# Patient Record
Sex: MALE | Employment: FULL TIME | URBAN - METROPOLITAN AREA
[De-identification: names, ages, dates, MRNs, and addresses within clinical notes are randomized per-mention and may not be internally consistent; named-entity substitution may affect disease eponyms.]

---

## 2023-06-05 LAB
ALANINE AMINOTRANSFERASE (SGPT) (U/L) IN SER/PLAS: 34 U/L (ref 10–52)
ALBUMIN (G/DL) IN SER/PLAS: 4.9 G/DL (ref 3.4–5)
ALKALINE PHOSPHATASE (U/L) IN SER/PLAS: 92 U/L (ref 33–120)
ANION GAP IN SER/PLAS: 11 MMOL/L (ref 10–20)
ASPARTATE AMINOTRANSFERASE (SGOT) (U/L) IN SER/PLAS: 39 U/L (ref 9–39)
BILIRUBIN TOTAL (MG/DL) IN SER/PLAS: 0.5 MG/DL (ref 0–1.2)
CALCIDIOL (25 OH VITAMIN D3) (NG/ML) IN SER/PLAS: 32 NG/ML
CALCIUM (MG/DL) IN SER/PLAS: 10.5 MG/DL (ref 8.6–10.6)
CARBON DIOXIDE, TOTAL (MMOL/L) IN SER/PLAS: 32 MMOL/L (ref 21–32)
CHLORIDE (MMOL/L) IN SER/PLAS: 101 MMOL/L (ref 98–107)
CHOLESTEROL (MG/DL) IN SER/PLAS: 184 MG/DL (ref 0–199)
CHOLESTEROL IN HDL (MG/DL) IN SER/PLAS: 35.3 MG/DL
CHOLESTEROL/HDL RATIO: 5.2
CREATININE (MG/DL) IN SER/PLAS: 1.02 MG/DL (ref 0.5–1.3)
ERYTHROCYTE DISTRIBUTION WIDTH (RATIO) BY AUTOMATED COUNT: 11.9 % (ref 11.5–14.5)
ERYTHROCYTE MEAN CORPUSCULAR HEMOGLOBIN CONCENTRATION (G/DL) BY AUTOMATED: 32 G/DL (ref 32–36)
ERYTHROCYTE MEAN CORPUSCULAR VOLUME (FL) BY AUTOMATED COUNT: 92 FL (ref 80–100)
ERYTHROCYTES (10*6/UL) IN BLOOD BY AUTOMATED COUNT: 4.98 X10E12/L (ref 4.5–5.9)
FERRITIN (UG/LL) IN SER/PLAS: 289 UG/L (ref 20–300)
GFR MALE: >90 ML/MIN/1.73M2
GLUCOSE (MG/DL) IN SER/PLAS: 90 MG/DL (ref 74–99)
HEMATOCRIT (%) IN BLOOD BY AUTOMATED COUNT: 45.6 % (ref 41–52)
HEMOGLOBIN (G/DL) IN BLOOD: 14.6 G/DL (ref 13.5–17.5)
LDL: 122 MG/DL (ref 0–119)
LEUKOCYTES (10*3/UL) IN BLOOD BY AUTOMATED COUNT: 5.1 X10E9/L (ref 4.4–11.3)
NRBC (PER 100 WBCS) BY AUTOMATED COUNT: 0 /100 WBC (ref 0–0)
PLATELETS (10*3/UL) IN BLOOD AUTOMATED COUNT: 271 X10E9/L (ref 150–450)
POTASSIUM (MMOL/L) IN SER/PLAS: 4.2 MMOL/L (ref 3.5–5.3)
PROTEIN TOTAL: 8 G/DL (ref 6.4–8.2)
SODIUM (MMOL/L) IN SER/PLAS: 140 MMOL/L (ref 136–145)
THYROTROPIN (MIU/L) IN SER/PLAS BY DETECTION LIMIT <= 0.05 MIU/L: 1.57 MIU/L (ref 0.44–3.98)
TRIGLYCERIDE (MG/DL) IN SER/PLAS: 134 MG/DL (ref 0–149)
URATE (MG/DL) IN SER/PLAS: 5.9 MG/DL (ref 4–7.5)
UREA NITROGEN (MG/DL) IN SER/PLAS: 12 MG/DL (ref 6–23)
VLDL: 27 MG/DL (ref 0–40)

## 2023-09-30 DIAGNOSIS — F90.2 ATTENTION DEFICIT HYPERACTIVITY DISORDER (ADHD), COMBINED TYPE: ICD-10-CM

## 2023-09-30 RX ORDER — DEXTROAMPHETAMINE SACCHARATE, AMPHETAMINE ASPARTATE MONOHYDRATE, DEXTROAMPHETAMINE SULFATE, AMPHETAMINE SULFATE 9.375; 9.375; 9.375; 9.375 MG/1; MG/1; MG/1; MG/1
37.5 CAPSULE, EXTENDED RELEASE ORAL EVERY MORNING
Qty: 30 CAPSULE | Refills: 0 | Status: SHIPPED | OUTPATIENT
Start: 2023-09-30 | End: 2023-11-07 | Stop reason: SDUPTHER

## 2023-11-07 DIAGNOSIS — F90.2 ATTENTION DEFICIT HYPERACTIVITY DISORDER (ADHD), COMBINED TYPE: ICD-10-CM

## 2023-11-07 RX ORDER — DEXTROAMPHETAMINE SACCHARATE, AMPHETAMINE ASPARTATE MONOHYDRATE, DEXTROAMPHETAMINE SULFATE, AMPHETAMINE SULFATE 9.375; 9.375; 9.375; 9.375 MG/1; MG/1; MG/1; MG/1
37.5 CAPSULE, EXTENDED RELEASE ORAL EVERY MORNING
Qty: 30 CAPSULE | Refills: 0 | Status: SHIPPED | OUTPATIENT
Start: 2023-11-07 | End: 2023-12-16 | Stop reason: SDUPTHER

## 2023-11-26 DIAGNOSIS — M25.572 LEFT ANKLE PAIN, UNSPECIFIED CHRONICITY: ICD-10-CM

## 2023-12-13 ENCOUNTER — PROCEDURE VISIT (OUTPATIENT)
Dept: ORTHOPEDIC SURGERY | Facility: CLINIC | Age: 28
End: 2023-12-13
Payer: COMMERCIAL

## 2023-12-13 DIAGNOSIS — S06.0XAA CONCUSSION WITH UNKNOWN LOSS OF CONSCIOUSNESS STATUS, INITIAL ENCOUNTER: Primary | ICD-10-CM

## 2023-12-13 PROCEDURE — 96116 NUBHVL XM PHYS/QHP 1ST HR: CPT | Performed by: CLINICAL NEUROPSYCHOLOGIST

## 2023-12-13 PROCEDURE — 96133 NRPSYC TST EVAL PHYS/QHP EA: CPT | Performed by: CLINICAL NEUROPSYCHOLOGIST

## 2023-12-13 PROCEDURE — 96137 PSYCL/NRPSYC TST PHY/QHP EA: CPT | Performed by: CLINICAL NEUROPSYCHOLOGIST

## 2023-12-13 PROCEDURE — 96136 PSYCL/NRPSYC TST PHY/QHP 1ST: CPT | Performed by: CLINICAL NEUROPSYCHOLOGIST

## 2023-12-13 PROCEDURE — 96132 NRPSYC TST EVAL PHYS/QHP 1ST: CPT | Performed by: CLINICAL NEUROPSYCHOLOGIST

## 2023-12-13 NOTE — PROGRESS NOTES
NEUROPSYCHOLOGICAL CONSULTATION REPORT    PLAYER NAME:  Babatunde Dee  DATE OF INJURY:  12/10/23  DATE OF CONSULT:  12/13/23    HISTORY: Babatunde Dee is a 28 y.o.  who was referred by club staff for neuropsychological evaluation to assess his recovery following a concussion on 12/10/23. He sustained the injury during a game when he sustained a helmet-helmet contact. He reported immediate symptoms of headache, dizziness, confusion, and removed himself from play. He also reported possibly having a brief loss of consciousness on the sidelines after that, with teammates and coaches having to help him stand. He was evaluated by his sports medicine staff and placed in the concussion protocol. Symptoms were noted to progressively improve such that he had some headache on the night of the injury but he reported no symptoms by the morning of the day after. He has moved through the initial steps of the exercise protocol without symptom exacerbation. At the time of the current evaluation, the athlete reported feeling 100% back to baseline.         PAST MEDICAL HISTORY:  In addition to his history of recent concussion, he reported no prior concussions. The athlete did report a history of ADHD that was diagnosed in college and is prescribed MyDayis; however, he noted that within recent weeks, he has stopped taking this medication because he feels that he wants to learn better behavioral techniques for management of his symptoms as opposed to relying on medication. No other additional contributory neurological/medical history was endorsed.      CURRENT MEDICATIONS: Mydayis (37.5 mg; not taken on the date of the evaluation).      PAST PSYCHIATRIC HISTORY:  The athlete denied changes in psychiatric status or behavioral symptoms following the injury on 12/10/23.  He also denied any other psychiatric history relevant to his concussion recovery.     EDUCATIONAL HISTORY:  The athlete reported having completed 3 years of a  bachelor's degree.  He reported a longstanding strength in science and more hands-on subjects, with weaker performance in subjects such as history but never required an IEP.    NEURODIAGNOSTIC STUDIES: N/A.    EXAMINATION FINDINGS:  In addition to the clinical interview, the following measures were administered:  ImPACT  Wechsler Test of Adult Reading (WTAR)  Ortega Verbal Learning Test - Revised (HVLT-R)  Brief Visuospatial Memory Test - Revised (BVMT-R)  Controlled Oral Word Association Test (COWAT)  WAIS-IV Digit Span   WAIS-IV Processing Speed Index  Trail Making Test, Part A  Trail Making Test, Part B  Post-Concussive Scale, Revised (PCS-R)    The athlete completed computerized concussion testing (ImPACT).  His most recent baseline testing was completed on 6/2/2023, with 1 other baseline administration on 8/4/2020. Repeat testing at the time of the current evaluation showed a decline in reaction time relative to prior baselines (including an aggregate baseline) but all other performances were consistent with baseline levels across ImPACT composites.    Baseline data on paper/pencil neurocognitive measures was not available as a reference for the current evaluation, but a premorbid estimate (WTAR) placed the athlete into the average range. Performances showed some variability, with scores ranging from the low average to the above average range including auditory attention (Digit Span; high average range), visual attention/processing speed (WAIS-IV Processing Speed Index and Trail Making Test, Part A; above average  range), verbal fluency/executive functioning (COWA and Trail Making Test, Part B; average to high average range but 4 errors noted on Part B), verbal memory (HVLT-R; low average range), and visual learning/memory (BVMT-R; average range).     The athlete endorsed no symptoms prior to or following testing (PCS-R).    CLINICAL IMPRESSION & RECOMMENDATIONS:    Babatunde Dee was diagnosed with a  concussion on 12/10/23 but has reported full symptom freedom and no symptom exacerbation with the early steps of the exercise protocol. On a hybrid battery of neurocognitive testing at the time of the current evaluation, Mr. Dee showed performance that was below baseline levels in reaction time on computerized testing and he showed some variability across paper/pencil testing. These findings may reflect the influence of his ADHD to some degree given that any prior testing data was obtained while on stimulant medications (which were not taken today), though the athlete is only 3 days post-injury as well.

## 2023-12-15 ENCOUNTER — PROCEDURE VISIT (OUTPATIENT)
Dept: ORTHOPEDIC SURGERY | Facility: CLINIC | Age: 28
End: 2023-12-15
Payer: COMMERCIAL

## 2023-12-15 ENCOUNTER — OFFICE VISIT (OUTPATIENT)
Dept: NEUROSURGERY | Facility: HOSPITAL | Age: 28
End: 2023-12-15
Payer: COMMERCIAL

## 2023-12-15 DIAGNOSIS — S06.0XAD CONCUSSION WITH UNKNOWN LOSS OF CONSCIOUSNESS STATUS, SUBSEQUENT ENCOUNTER: Primary | ICD-10-CM

## 2023-12-15 PROCEDURE — 96137 PSYCL/NRPSYC TST PHY/QHP EA: CPT | Performed by: CLINICAL NEUROPSYCHOLOGIST

## 2023-12-15 PROCEDURE — 96136 PSYCL/NRPSYC TST PHY/QHP 1ST: CPT | Performed by: CLINICAL NEUROPSYCHOLOGIST

## 2023-12-15 PROCEDURE — 99212 OFFICE O/P EST SF 10 MIN: CPT | Performed by: SURGERY

## 2023-12-15 PROCEDURE — 96133 NRPSYC TST EVAL PHYS/QHP EA: CPT | Performed by: CLINICAL NEUROPSYCHOLOGIST

## 2023-12-15 PROCEDURE — 96132 NRPSYC TST EVAL PHYS/QHP 1ST: CPT | Performed by: CLINICAL NEUROPSYCHOLOGIST

## 2023-12-15 NOTE — PROGRESS NOTES
NEUROPSYCHOLOGICAL CONSULTATION REPORT    PLAYER NAME:  Babatunde Dee  DATE OF INJURY:  12/10/23  DATE OF CONSULT:  12/15/23    HISTORY: Babatunde Dee is a 28 y.o.  who was referred by club staff for neuropsychological evaluation to assess his recovery following a concussion on 12/10/23. He reported no symptoms by the morning of the day after. He has moved through the initial steps of the exercise protocol without symptom exacerbation. At the time of the current evaluation, the athlete reported feeling 100% back to baseline.       The athlete underwent testing on 12/13/23. At that time, he showed performance that was below baseline levels in reaction time on computerized testing and he showed some variability across paper/pencil testing. It was noted at that time that he had not taken stimulant medication on the date of testing but had taken them on the date of his baseline. Findings were felt to likely be related to ADHD but it was decided to repeat the evaluation given the recency of his injury.    At the time of the current evaluation, he reported having taken his stimulant medication.    PAST MEDICAL HISTORY:  In addition to his history of concussion, he reported no prior concussions. As noted above, the athlete did report a history of ADHD that was diagnosed in college and is prescribed MyDayis. No other additional contributory neurological/medical history was endorsed.      CURRENT MEDICATIONS: Mydayis (37.5 mg).      PAST PSYCHIATRIC HISTORY:  The athlete denied changes in psychiatric status or behavioral symptoms following the injury on 12/10/23.  He also denied any other psychiatric history relevant to his concussion recovery.     EDUCATIONAL HISTORY:  The athlete reported having completed 3 years of a bachelor's degree.  He reported a longstanding strength in science and more hands-on subjects, with weaker performance in subjects such as history but never required an IEP.    NEURODIAGNOSTIC  STUDIES: N/A.    EXAMINATION FINDINGS:  In addition to the clinical interview, the following measures were administered:  ImPACT  Ortega Verbal Learning Test - Revised (HVLT-R)  Brief Visuospatial Memory Test - Revised (BVMT-R)  Controlled Oral Word Association Test (COWAT)  WAIS-IV Digit Span   WAIS-IV Processing Speed Index  Trail Making Test, Part A  Trail Making Test, Part B  Post-Concussive Scale, Revised (PCS-R)    At the time of the evaluation, the athlete showed significant test anxiety which he acknowledged. On paper/pencil testing, this was most notable on the HVLT-R which was the first test administered. It was again observed at the time of ImPACT testing, with the athlete receiving an error message during the computerized testing because of having exceeded the time limit for the test. An alternate form of ImPACT was completed, though anxiety likely affected results on that as well.     The athlete completed computerized concussion testing (ImPACT).  His most recent baseline testing was completed on 6/2/2023, with 1 other baseline administration on 8/4/2020. Repeat testing at the time of the current evaluation showed substantial improvement in reaction time (now exceptionally high range) which was previously below baseline levels. His visual memory composite was declined relative to his most recent baseline but not relative to the aggregate of  both his 2020 and 2023 performances. Verbal memory and visual motor speed were at or above both his most recent and aggregate baselines.    Baseline data on paper/pencil neurocognitive measures was not available as a reference for the current evaluation, but a premorbid estimate (WTAR) placed the athlete into the average range. Performances were average-high average range across most measures, including auditory attention (Digit Span; high average range), visual attention/processing speed (WAIS-IV Processing Speed Index and Trail Making Test, Part A; average to  high average range), verbal fluency/executive functioning (COWA and Trail Making Test, Part B; average to high average range and no errors noted on Part B), and visual learning/memory (BVMT-R; average range). Verbal memory performance was weak in his overall learning of the list (HVLT-R Total Recall, exceptionally low range) likely related to observed anxiety; however, his retention of learned information was strong at the delay (100% retained).    The athlete endorsed no symptoms prior to or following testing (PCS-R).    CLINICAL IMPRESSION & RECOMMENDATIONS:    Babatunde Dee was diagnosed with a concussion on 12/10/23 but has reported full symptom freedom and no symptom exacerbation with the steps of the exercise protocol. On a hybrid battery of neurocognitive testing at the time of the current evaluation, Mr. Dee showed some test-taking anxiety that likely impacted performances to some degree; however, the performance that was previously below an observed baseline (i.e. computerized reaction time) showed significant improvement and overall findings are now consistent with his neurocognitive baseline/expected levels.

## 2023-12-16 DIAGNOSIS — F90.2 ATTENTION DEFICIT HYPERACTIVITY DISORDER (ADHD), COMBINED TYPE: ICD-10-CM

## 2023-12-16 RX ORDER — DEXTROAMPHETAMINE SACCHARATE, AMPHETAMINE ASPARTATE MONOHYDRATE, DEXTROAMPHETAMINE SULFATE, AMPHETAMINE SULFATE 9.375; 9.375; 9.375; 9.375 MG/1; MG/1; MG/1; MG/1
37.5 CAPSULE, EXTENDED RELEASE ORAL EVERY MORNING
Qty: 30 CAPSULE | Refills: 0 | Status: SHIPPED | OUTPATIENT
Start: 2023-12-16 | End: 2023-12-20 | Stop reason: SDUPTHER

## 2023-12-16 NOTE — PROGRESS NOTES
Subjective   Patient ID: Babatunde Dee is a 28 y.o. male who presents for Clearance from the [facility] Concussion Protocol.  HPI  I had the pleasure of seeing Mr. Babatunde Dee in clinic today for evaluation and possible clearance from the [facility] Concussion Protocol. 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 He had confusion and mild headache, as well as ataxia on the sideline. His symptoms had resolved by Monday morning and he has progressed through the return to play protocol without additional issue. He did have some challenges with his neuropsychological testing as he was not taking his ADHD medications, however when he retook the test, on his meds, he stated that the results were appropriate. Today, he states that he feels 100% back to his baseline. He denies headaches, nausea, vomiting. Dizziness, confusion, difficulty with balance, irritability, sensitivity to light or sound, and issues with sleep or appetite. He says he is ready to return to play without hesitation.     Review of Systems  I have completed a full 12 point review of systems, all of which are negative, except what is presented in the HPI, or stated below.      Objective   There were no vitals taken for this visit.    Physical Exam  Awake, alert, interactive, appropriate. Normal repetition and recall. Normal serial 7s and [place] questionaire. Normal respiratory pattern without audible wheezing. The skin is warm and dry and there are no visible rashes or lesions. The peripheral pulses are symmetric. The abdomen is flat and non-distended. There is normal bulk and tone. The speech is fluent.     The pupils are equal, round and reactive to light, the extra ocular movements are intact. The face is symmetric, the tongue is midline, the palate elevates symmetrically. Facial sensation is intact. Shoulder shrug is symmetric. Strength is 5/5 in all extremities and there is normal tone. Sensation  is intact to light touch in all extremities. Reflexes are normal and symmetric, and there are no pathologic reflexes. No dysmetria. Normal balance. Tandem Gait. 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 He has been in the [facility] Concussion Protocol and today, is in clinic for clearance. He reports no residual symptoms stating that he is 100% back to his normal baseline, has completed all of the necessary steps in the Return to Play protocol, and has a normal neurological exam. He has also undergone a hybrid battery of neuropsychological testing which shows he is at his cognitive baseline. Given these above criteria, I have given him formal clearance to return to play. Mr. Dee and I discussed the importance of reporting any new symptoms to club medical staff and he acknowledged this. I will contact the teams lead medical physician and head athletic trainer to inform them of the results of my exam. Both Mr. Dee and the team can contact my office if they have any additional questions or concerns.           Naveed Alford MD 12/15/23 10:56 PM

## 2023-12-20 DIAGNOSIS — F90.2 ATTENTION DEFICIT HYPERACTIVITY DISORDER (ADHD), COMBINED TYPE: ICD-10-CM

## 2023-12-20 RX ORDER — DEXTROAMPHETAMINE SACCHARATE, AMPHETAMINE ASPARTATE MONOHYDRATE, DEXTROAMPHETAMINE SULFATE, AMPHETAMINE SULFATE 12.5; 12.5; 12.5; 12.5 MG/1; MG/1; MG/1; MG/1
50 CAPSULE, EXTENDED RELEASE ORAL EVERY MORNING
Qty: 30 CAPSULE | Refills: 0 | Status: SHIPPED | OUTPATIENT
Start: 2023-12-20 | End: 2024-02-02 | Stop reason: SDUPTHER

## 2024-02-02 DIAGNOSIS — F90.2 ATTENTION DEFICIT HYPERACTIVITY DISORDER (ADHD), COMBINED TYPE: ICD-10-CM

## 2024-02-02 RX ORDER — DEXTROAMPHETAMINE SACCHARATE, AMPHETAMINE ASPARTATE MONOHYDRATE, DEXTROAMPHETAMINE SULFATE, AMPHETAMINE SULFATE 12.5; 12.5; 12.5; 12.5 MG/1; MG/1; MG/1; MG/1
50 CAPSULE, EXTENDED RELEASE ORAL EVERY MORNING
Qty: 30 CAPSULE | Refills: 0 | Status: SHIPPED | OUTPATIENT
Start: 2024-02-02 | End: 2024-03-03

## 2024-03-12 DIAGNOSIS — F90.2 ATTENTION DEFICIT HYPERACTIVITY DISORDER (ADHD), COMBINED TYPE: ICD-10-CM

## 2024-03-12 RX ORDER — DEXTROAMPHETAMINE SULFATE, DEXTROAMPHETAMINE SACCHARATE, AMPHETAMINE ASPARTATE MONOHYDRATE, AND AMPHETAMINE SULFATE 12.5; 12.5; 12.5; 12.5 MG/1; MG/1; MG/1; MG/1
50 CAPSULE, EXTENDED RELEASE ORAL EVERY MORNING
Qty: 30 CAPSULE | Refills: 0 | Status: SHIPPED | OUTPATIENT
Start: 2024-03-12 | End: 2024-04-15 | Stop reason: SDUPTHER

## 2024-04-15 DIAGNOSIS — F90.2 ATTENTION DEFICIT HYPERACTIVITY DISORDER (ADHD), COMBINED TYPE: ICD-10-CM

## 2024-04-15 RX ORDER — DEXTROAMPHETAMINE SULFATE, DEXTROAMPHETAMINE SACCHARATE, AMPHETAMINE ASPARTATE MONOHYDRATE, AND AMPHETAMINE SULFATE 12.5; 12.5; 12.5; 12.5 MG/1; MG/1; MG/1; MG/1
50 CAPSULE, EXTENDED RELEASE ORAL EVERY MORNING
Qty: 30 CAPSULE | Refills: 0 | Status: SHIPPED | OUTPATIENT
Start: 2024-04-15